# Patient Record
Sex: MALE | Race: BLACK OR AFRICAN AMERICAN | NOT HISPANIC OR LATINO | Employment: FULL TIME | URBAN - METROPOLITAN AREA
[De-identification: names, ages, dates, MRNs, and addresses within clinical notes are randomized per-mention and may not be internally consistent; named-entity substitution may affect disease eponyms.]

---

## 2024-08-16 ENCOUNTER — HOSPITAL ENCOUNTER (EMERGENCY)
Facility: HOSPITAL | Age: 64
Discharge: HOME | End: 2024-08-16
Attending: EMERGENCY MEDICINE
Payer: COMMERCIAL

## 2024-08-16 VITALS
BODY MASS INDEX: 26.05 KG/M2 | WEIGHT: 203 LBS | SYSTOLIC BLOOD PRESSURE: 140 MMHG | TEMPERATURE: 96.6 F | HEIGHT: 74 IN | DIASTOLIC BLOOD PRESSURE: 75 MMHG | RESPIRATION RATE: 18 BRPM | HEART RATE: 82 BPM | OXYGEN SATURATION: 98 %

## 2024-08-16 DIAGNOSIS — R33.9 URINARY RETENTION: Primary | ICD-10-CM

## 2024-08-16 LAB
ALBUMIN SERPL BCP-MCNC: 4.3 G/DL (ref 3.4–5)
ALP SERPL-CCNC: 240 U/L (ref 33–136)
ALT SERPL W P-5'-P-CCNC: 22 U/L (ref 10–52)
ANION GAP SERPL CALC-SCNC: 10 MMOL/L (ref 10–20)
APPEARANCE UR: CLEAR
AST SERPL W P-5'-P-CCNC: 21 U/L (ref 9–39)
BASOPHILS # BLD AUTO: 0.03 X10*3/UL (ref 0–0.1)
BASOPHILS NFR BLD AUTO: 0.4 %
BILIRUB SERPL-MCNC: 0.7 MG/DL (ref 0–1.2)
BILIRUB UR STRIP.AUTO-MCNC: NEGATIVE MG/DL
BUN SERPL-MCNC: 16 MG/DL (ref 6–23)
CALCIUM SERPL-MCNC: 9.3 MG/DL (ref 8.6–10.3)
CHLORIDE SERPL-SCNC: 105 MMOL/L (ref 98–107)
CO2 SERPL-SCNC: 27 MMOL/L (ref 21–32)
COLOR UR: ABNORMAL
CREAT SERPL-MCNC: 1.36 MG/DL (ref 0.5–1.3)
EGFRCR SERPLBLD CKD-EPI 2021: 58 ML/MIN/1.73M*2
EOSINOPHIL # BLD AUTO: 0.02 X10*3/UL (ref 0–0.7)
EOSINOPHIL NFR BLD AUTO: 0.3 %
ERYTHROCYTE [DISTWIDTH] IN BLOOD BY AUTOMATED COUNT: 12.9 % (ref 11.5–14.5)
GLUCOSE SERPL-MCNC: 137 MG/DL (ref 74–99)
GLUCOSE UR STRIP.AUTO-MCNC: NORMAL MG/DL
HCT VFR BLD AUTO: 43.9 % (ref 41–52)
HGB BLD-MCNC: 14.5 G/DL (ref 13.5–17.5)
IMM GRANULOCYTES # BLD AUTO: 0.02 X10*3/UL (ref 0–0.7)
IMM GRANULOCYTES NFR BLD AUTO: 0.3 % (ref 0–0.9)
KETONES UR STRIP.AUTO-MCNC: NEGATIVE MG/DL
LEUKOCYTE ESTERASE UR QL STRIP.AUTO: NEGATIVE
LYMPHOCYTES # BLD AUTO: 0.91 X10*3/UL (ref 1.2–4.8)
LYMPHOCYTES NFR BLD AUTO: 13.1 %
MCH RBC QN AUTO: 30.3 PG (ref 26–34)
MCHC RBC AUTO-ENTMCNC: 33 G/DL (ref 32–36)
MCV RBC AUTO: 92 FL (ref 80–100)
MONOCYTES # BLD AUTO: 0.39 X10*3/UL (ref 0.1–1)
MONOCYTES NFR BLD AUTO: 5.6 %
NEUTROPHILS # BLD AUTO: 5.59 X10*3/UL (ref 1.2–7.7)
NEUTROPHILS NFR BLD AUTO: 80.3 %
NITRITE UR QL STRIP.AUTO: NEGATIVE
NRBC BLD-RTO: 0 /100 WBCS (ref 0–0)
PH UR STRIP.AUTO: 6 [PH]
PLATELET # BLD AUTO: 239 X10*3/UL (ref 150–450)
POTASSIUM SERPL-SCNC: 4.1 MMOL/L (ref 3.5–5.3)
PROT SERPL-MCNC: 7.2 G/DL (ref 6.4–8.2)
PROT UR STRIP.AUTO-MCNC: NEGATIVE MG/DL
RBC # BLD AUTO: 4.78 X10*6/UL (ref 4.5–5.9)
RBC # UR STRIP.AUTO: ABNORMAL /UL
RBC #/AREA URNS AUTO: ABNORMAL /HPF
SODIUM SERPL-SCNC: 138 MMOL/L (ref 136–145)
SP GR UR STRIP.AUTO: 1.02
UROBILINOGEN UR STRIP.AUTO-MCNC: NORMAL MG/DL
WBC # BLD AUTO: 7 X10*3/UL (ref 4.4–11.3)
WBC #/AREA URNS AUTO: ABNORMAL /HPF

## 2024-08-16 PROCEDURE — 51702 INSERT TEMP BLADDER CATH: CPT

## 2024-08-16 PROCEDURE — 85025 COMPLETE CBC W/AUTO DIFF WBC: CPT

## 2024-08-16 PROCEDURE — 36415 COLL VENOUS BLD VENIPUNCTURE: CPT

## 2024-08-16 PROCEDURE — P9612 CATHETERIZE FOR URINE SPEC: HCPCS

## 2024-08-16 PROCEDURE — 99283 EMERGENCY DEPT VISIT LOW MDM: CPT

## 2024-08-16 PROCEDURE — 80053 COMPREHEN METABOLIC PANEL: CPT

## 2024-08-16 PROCEDURE — 81001 URINALYSIS AUTO W/SCOPE: CPT

## 2024-08-16 ASSESSMENT — COLUMBIA-SUICIDE SEVERITY RATING SCALE - C-SSRS
1. IN THE PAST MONTH, HAVE YOU WISHED YOU WERE DEAD OR WISHED YOU COULD GO TO SLEEP AND NOT WAKE UP?: NO
2. HAVE YOU ACTUALLY HAD ANY THOUGHTS OF KILLING YOURSELF?: NO
6. HAVE YOU EVER DONE ANYTHING, STARTED TO DO ANYTHING, OR PREPARED TO DO ANYTHING TO END YOUR LIFE?: NO

## 2024-08-16 ASSESSMENT — PAIN DESCRIPTION - DESCRIPTORS: DESCRIPTORS: PRESSURE

## 2024-08-16 ASSESSMENT — PAIN DESCRIPTION - LOCATION: LOCATION: ABDOMEN

## 2024-08-16 ASSESSMENT — PAIN - FUNCTIONAL ASSESSMENT: PAIN_FUNCTIONAL_ASSESSMENT: 0-10

## 2024-08-16 ASSESSMENT — PAIN DESCRIPTION - ORIENTATION: ORIENTATION: LOWER

## 2024-08-16 ASSESSMENT — PAIN SCALES - GENERAL: PAINLEVEL_OUTOF10: 10 - WORST POSSIBLE PAIN

## 2024-08-16 NOTE — ED TRIAGE NOTES
Pt presents with the c/o inability to urinate. Pt states that his last void was 2300 last evening. Pt states he has prostate problems.

## 2024-08-16 NOTE — ED PROVIDER NOTES
History of Present Illness     History provided by: Patient  Limitations to History: None  External Records Reviewed with Brief Summary: No old records available for review    HPI:  Hernandez Lazcano is a 64 y.o. male with a past medical history of BPH presenting to the emergency department today with urinary retention and abdominal distention/discomfort that has been ongoing since 430 this morning.  States he last urinated around 11 PM last night.  Denies any fever or chills.  Notes that he has problems with his prostate and has had gradual increase in discomfort has attempted to make urology appointment however due to either his work schedule or their schedule he has been unsuccessful.  Denies any surgical history.  Denies any burning with urination prior to the onset of this.  Denies any urinary frequency prior to this.  Denies any nausea or vomiting.  Denies pain but notes he is uncomfortable.  Denies similar symptoms in the past.  No other associated signs or symptoms report at this time.    Physical Exam   Triage vitals:  T 35.9 °C (96.6 °F)  HR (!) 108  BP (!) 165/149 (unable to sit still)  RR (!) 22  O2 97 % None (Room air)    Physical Exam  Constitutional:       General: He is in acute distress (Uncomfortable and pacing around the room.).      Appearance: Normal appearance. He is not toxic-appearing.   HENT:      Head: Normocephalic and atraumatic.      Mouth/Throat:      Mouth: Mucous membranes are moist.   Eyes:      General: No scleral icterus.     Conjunctiva/sclera: Conjunctivae normal.   Cardiovascular:      Rate and Rhythm: Regular rhythm. Tachycardia present.      Pulses: Normal pulses.   Pulmonary:      Effort: Pulmonary effort is normal.      Breath sounds: Normal breath sounds.   Abdominal:      General: There is distension (Significant abdominal distention).      Palpations: Abdomen is soft.      Tenderness: There is no abdominal tenderness.   Musculoskeletal:         General: Normal range of  motion.      Cervical back: Normal range of motion and neck supple.   Skin:     General: Skin is warm and dry.      Capillary Refill: Capillary refill takes less than 2 seconds.   Neurological:      General: No focal deficit present.      Mental Status: He is alert.      Gait: Gait normal.   Psychiatric:         Mood and Affect: Mood normal.         Behavior: Behavior normal.         Thought Content: Thought content normal.         Judgment: Judgment normal.          Medical Decision Making & ED Course   Medical Decision Makin y.o. male with a past medical history of BPH coming in with concern for urinary retention.  Notes that he is never had similar symptoms in the past.  Labs in addition to a UA were collected.  No concern for UTI.  Patient does have a slight LAILA creatinine of 1.36 likely in the setting of urinary retention.  Bladder scan showed 566, a coudé catheter was placed.  Patient was provided with follow-up instructions with urology in addition to discharge instructions on Hi care and maintenance.  Some blood was noted in the urine likely in the setting of Hi catheter placement.  Patient's blood pressure in addition to heart rate and respiration improved after the placement of the Hi catheter.  Vitals likely abnormal in the setting of discomfort and distress that the patient was experiencing with the urinary retention.  Notes that he is already on Flomax for his BPH.  Return precautions and follow-up instructions were provided to the patient and patient discharged home in stable condition.  ----    Differential diagnoses considered include but are not limited to: Urinary retention, enlarged prostate, UTI     Social Determinants of Health which Significantly Impact Care: None identified     EKG Independent Interpretation: See ED Course    Independent Result Review and Interpretation: See ED course    Chronic conditions affecting the patient's care: See HPI    The patient was discussed with  the following consultants/services: None    Care Considerations: See [facility]    ED Course:  Diagnoses as of 08/16/24 1413   Urinary retention     Disposition   As a result of the work-up, the patient was discharged home.  he was informed of his diagnosis and instructed to come back with any concerns or worsening of condition.  he and was agreeable to the plan as discussed above.  he was given the opportunity to ask questions.  All of the patient's questions were answered.    Seen and discussed with ED Attending  Shilpi Gonzlaes DO, PGY-2  Emergency Medicine     Shilpi Gonzales DO  Resident  08/16/24 7102

## 2024-08-16 NOTE — DISCHARGE INSTRUCTIONS
If you have any new or worsening symptoms please return to the emergency department.  You have a Hi catheter that was placed to have attached instructions on how to take care of this.  It is important for you to follow-up with urology outpatient and discuss when this needs to be removed.

## 2024-08-21 NOTE — PROGRESS NOTES
[facility]  Outpatient Clinic Note    Camron Lazcano is a 64 y.o. male    History of Present Illness   Patient presenting to clinic today NPV for TOV s/p ED visit 8/16/2024 for urinary retention.  ml. Hi catheter placed. History of BPH taking Tamsulosin 0.4 mg. Reports that he has not been consistently taking Flomax. He has no PCP and no other significant medical history. Prior to ED visit he reports that he had missed a couple of days of Flomax and was having difficulty emptying bladder. No gross hematuria, dysuria, frequency, urgency, or UTI history     Past Medical History and Surgical History   No past medical history on file.  No past surgical history on file.    Medications  No current outpatient medications on file prior to visit.     No current facility-administered medications on file prior to visit.       Objective   Physicial Exam  General: Well developed, well nourished, alert and cooperative, appears in no acute distress  Eyes: Non-injected conjunctiva, sclera clear, no proptosis  Cardiac: Extremities are warm and well perfused. No edema, cyanosis or pallor.   Lungs: Breathing is easy, non-labored. Speaking in clear and complete sentences. Normal diaphragmatic movement.  MSK: Ambulatory with steady gait, unassisted  Neuro: alert and oriented to person, place and time  Psych: Demonstrates good judgement and reason, without hallucinations, abnormal affect or abnormal behaviors.  Skin: no obvious lesions, no rashes.    No visits with results within 1 Day(s) from this visit.   Latest known visit with results is:   Admission on 08/16/2024, Discharged on 08/16/2024   Component Date Value Ref Range Status    WBC 08/16/2024 7.0  4.4 - 11.3 x10*3/uL Final    nRBC 08/16/2024 0.0  0.0 - 0.0 /100 WBCs Final    RBC 08/16/2024 4.78  4.50 - 5.90 x10*6/uL Final    Hemoglobin 08/16/2024 14.5  13.5 - 17.5 g/dL Final    Hematocrit 08/16/2024 43.9  41.0 - 52.0 % Final    MCV 08/16/2024 92  80 -  100 fL Final    MCH 08/16/2024 30.3  26.0 - 34.0 pg Final    MCHC 08/16/2024 33.0  32.0 - 36.0 g/dL Final    RDW 08/16/2024 12.9  11.5 - 14.5 % Final    Platelets 08/16/2024 239  150 - 450 x10*3/uL Final    Neutrophils % 08/16/2024 80.3  40.0 - 80.0 % Final    Immature Granulocytes %, Automated 08/16/2024 0.3  0.0 - 0.9 % Final    Immature Granulocyte Count (IG) includes promyelocytes, myelocytes and metamyelocytes but does not include bands. Percent differential counts (%) should be interpreted in the context of the absolute cell counts (cells/UL).    Lymphocytes % 08/16/2024 13.1  13.0 - 44.0 % Final    Monocytes % 08/16/2024 5.6  2.0 - 10.0 % Final    Eosinophils % 08/16/2024 0.3  0.0 - 6.0 % Final    Basophils % 08/16/2024 0.4  0.0 - 2.0 % Final    Neutrophils Absolute 08/16/2024 5.59  1.20 - 7.70 x10*3/uL Final    Percent differential counts (%) should be interpreted in the context of the absolute cell counts (cells/uL).    Immature Granulocytes Absolute, Au* 08/16/2024 0.02  0.00 - 0.70 x10*3/uL Final    Lymphocytes Absolute 08/16/2024 0.91 (L)  1.20 - 4.80 x10*3/uL Final    Monocytes Absolute 08/16/2024 0.39  0.10 - 1.00 x10*3/uL Final    Eosinophils Absolute 08/16/2024 0.02  0.00 - 0.70 x10*3/uL Final    Basophils Absolute 08/16/2024 0.03  0.00 - 0.10 x10*3/uL Final    Glucose 08/16/2024 137 (H)  74 - 99 mg/dL Final    Sodium 08/16/2024 138  136 - 145 mmol/L Final    Potassium 08/16/2024 4.1  3.5 - 5.3 mmol/L Final    Chloride 08/16/2024 105  98 - 107 mmol/L Final    Bicarbonate 08/16/2024 27  21 - 32 mmol/L Final    Anion Gap 08/16/2024 10  10 - 20 mmol/L Final    Urea Nitrogen 08/16/2024 16  6 - 23 mg/dL Final    Creatinine 08/16/2024 1.36 (H)  0.50 - 1.30 mg/dL Final    eGFR 08/16/2024 58 (L)  >60 mL/min/1.73m*2 Final    Calculations of estimated GFR are performed using the 2021 CKD-EPI Study Refit equation without the race variable for the IDMS-Traceable creatinine  methods.  https://jasn.asnjournals.org/content/early/2021/09/22/ASN.3531334228    Calcium 08/16/2024 9.3  8.6 - 10.3 mg/dL Final    Albumin 08/16/2024 4.3  3.4 - 5.0 g/dL Final    Alkaline Phosphatase 08/16/2024 240 (H)  33 - 136 U/L Final    Total Protein 08/16/2024 7.2  6.4 - 8.2 g/dL Final    AST 08/16/2024 21  9 - 39 U/L Final    Bilirubin, Total 08/16/2024 0.7  0.0 - 1.2 mg/dL Final    ALT 08/16/2024 22  10 - 52 U/L Final    Patients treated with Sulfasalazine may generate falsely decreased results for ALT.    Color, Urine 08/16/2024 Light-Yellow  Light-Yellow, Yellow, Dark-Yellow Final    Appearance, Urine 08/16/2024 Clear  Clear Final    Specific Gravity, Urine 08/16/2024 1.016  1.005 - 1.035 Final    pH, Urine 08/16/2024 6.0  5.0, 5.5, 6.0, 6.5, 7.0, 7.5, 8.0 Final    Protein, Urine 08/16/2024 NEGATIVE  NEGATIVE, 10 (TRACE), 20 (TRACE) mg/dL Final    Glucose, Urine 08/16/2024 Normal  Normal mg/dL Final    Blood, Urine 08/16/2024 0.06 (1+) (A)  NEGATIVE Final    Ketones, Urine 08/16/2024 NEGATIVE  NEGATIVE mg/dL Final    Bilirubin, Urine 08/16/2024 NEGATIVE  NEGATIVE Final    Urobilinogen, Urine 08/16/2024 Normal  Normal mg/dL Final    Nitrite, Urine 08/16/2024 NEGATIVE  NEGATIVE Final    Leukocyte Esterase, Urine 08/16/2024 NEGATIVE  NEGATIVE Final    WBC, Urine 08/16/2024 1-5  1-5, NONE /HPF Final    RBC, Urine 08/16/2024 11-20 (A)  NONE, 1-2, 3-5 /HPF Final      Review of Systems  All other systems have been reviewed and are negative for complaint.      Assessment and Plan   We discussed urinary retention in great detail. We discussed management of urinary retention to include indwelling catheter or CIC. We discussed risks of unmanaged urinary retention including irreversible kidney injury and increased risk of UTI. We discussed TOV today.    [] TOV today passed 180 ml sterile water instilled, 160 ml output clear yellow urine   [] Drink plenty of fluids to maintain hydration and clear urine  output    Continue Tamsulosin 0.4 mg daily. Refilled today   RTC in 6 months, sooner if needed.     All questions and concerns were addressed. Patient verbalizes understanding and has no other questions at this time. If you have any questions about your care, do not hesitate to call and leave a message, we return calls in a timely manner.    Sanjuana Pop-- LEISA LAI  Office Phone:  [phone]

## 2024-08-22 ENCOUNTER — OFFICE VISIT (OUTPATIENT)
Dept: UROLOGY | Facility: CLINIC | Age: 64
End: 2024-08-22
Payer: COMMERCIAL

## 2024-08-22 DIAGNOSIS — N13.8 BPH WITH OBSTRUCTION/LOWER URINARY TRACT SYMPTOMS: Primary | ICD-10-CM

## 2024-08-22 DIAGNOSIS — N40.1 BPH WITH OBSTRUCTION/LOWER URINARY TRACT SYMPTOMS: Primary | ICD-10-CM

## 2024-08-22 DIAGNOSIS — R33.9 URINARY RETENTION: ICD-10-CM

## 2024-08-22 PROCEDURE — 99203 OFFICE O/P NEW LOW 30 MIN: CPT | Performed by: NURSE PRACTITIONER

## 2024-08-22 PROCEDURE — G2211 COMPLEX E/M VISIT ADD ON: HCPCS | Performed by: NURSE PRACTITIONER

## 2024-08-22 PROCEDURE — 51700 IRRIGATION OF BLADDER: CPT | Performed by: NURSE PRACTITIONER

## 2024-08-22 RX ORDER — TAMSULOSIN HYDROCHLORIDE 0.4 MG/1
0.4 CAPSULE ORAL
Qty: 90 CAPSULE | Refills: 3 | Status: SHIPPED | OUTPATIENT
Start: 2024-08-22 | End: 2024-08-22 | Stop reason: SDUPTHER

## 2024-08-22 RX ORDER — TAMSULOSIN HYDROCHLORIDE 0.4 MG/1
0.4 CAPSULE ORAL DAILY
COMMUNITY

## 2024-08-26 DIAGNOSIS — R33.9 URINARY RETENTION: ICD-10-CM

## 2024-08-26 DIAGNOSIS — N13.8 BPH WITH OBSTRUCTION/LOWER URINARY TRACT SYMPTOMS: Primary | ICD-10-CM

## 2024-08-26 DIAGNOSIS — N40.1 BPH WITH OBSTRUCTION/LOWER URINARY TRACT SYMPTOMS: Primary | ICD-10-CM

## 2024-08-26 RX ORDER — TAMSULOSIN HYDROCHLORIDE 0.4 MG/1
0.4 CAPSULE ORAL DAILY
Qty: 90 CAPSULE | Refills: 3 | Status: SHIPPED | OUTPATIENT
Start: 2024-08-26 | End: 2024-08-27 | Stop reason: SDUPTHER

## 2024-08-27 ENCOUNTER — OFFICE VISIT (OUTPATIENT)
Dept: UROLOGY | Facility: HOSPITAL | Age: 64
End: 2024-08-27
Payer: COMMERCIAL

## 2024-08-27 VITALS
HEART RATE: 117 BPM | WEIGHT: 199.74 LBS | TEMPERATURE: 97 F | RESPIRATION RATE: 20 BRPM | BODY MASS INDEX: 25.64 KG/M2 | DIASTOLIC BLOOD PRESSURE: 90 MMHG | OXYGEN SATURATION: 99 % | SYSTOLIC BLOOD PRESSURE: 169 MMHG

## 2024-08-27 DIAGNOSIS — N40.1 BPH WITH OBSTRUCTION/LOWER URINARY TRACT SYMPTOMS: ICD-10-CM

## 2024-08-27 DIAGNOSIS — N13.8 BPH WITH OBSTRUCTION/LOWER URINARY TRACT SYMPTOMS: ICD-10-CM

## 2024-08-27 DIAGNOSIS — R33.9 URINARY RETENTION: ICD-10-CM

## 2024-08-27 PROCEDURE — 51703 INSERT BLADDER CATH COMPLEX: CPT | Performed by: NURSE PRACTITIONER

## 2024-08-27 PROCEDURE — 99213 OFFICE O/P EST LOW 20 MIN: CPT | Performed by: NURSE PRACTITIONER

## 2024-08-27 PROCEDURE — 1036F TOBACCO NON-USER: CPT | Performed by: NURSE PRACTITIONER

## 2024-08-27 RX ORDER — TAMSULOSIN HYDROCHLORIDE 0.4 MG/1
0.8 CAPSULE ORAL DAILY
Qty: 180 CAPSULE | Refills: 3 | Status: SHIPPED | OUTPATIENT
Start: 2024-08-27

## 2024-08-27 ASSESSMENT — PAIN SCALES - GENERAL: PAINLEVEL: 0-NO PAIN

## 2024-08-27 NOTE — PROGRESS NOTES
[facility]  Outpatient Clinic Note    Subjective   Hernandez Lazcano is a 64 y.o. male    History of Present Illness   Patient presenting to clinic today for PVR check. Last visit with myself 8/22/2024 for TOV s/p ED visit 8/16/2024 for urinary retention.  ml. History of BPH taking Tamsulosin 0.4 mg. Reports that he has not been consistently taking Flomax. He has no PCP and no other significant medical history. Prior to ED visit he reports that he had missed a couple of days of Flomax and was having difficulty emptying bladder. Today's visit patient states that he has not been able to empty bladder since 11:00 pm last night.   PVR today 980 ml   He has been taking Tamsulosin 0.4 mg daily. He denies fevers, chills, n/v, or gross hematuria.       Past Medical History and Surgical History   No past medical history on file.  No past surgical history on file.    Medications  Current Outpatient Medications on File Prior to Visit   Medication Sig Dispense Refill    tamsulosin (Flomax) 0.4 mg 24 hr capsule Take 1 capsule (0.4 mg) by mouth once daily. 90 capsule 3    [DISCONTINUED] tamsulosin (Flomax) 0.4 mg 24 hr capsule Take 1 capsule (0.4 mg) by mouth once daily.      [DISCONTINUED] tamsulosin (Flomax) 0.4 mg 24 hr capsule Take 1 capsule (0.4 mg) by mouth once daily in the morning. Take before meals. 90 capsule 3     No current facility-administered medications on file prior to visit.       Objective   Physicial Exam  General: Well developed, well nourished, alert and cooperative, appears in no acute distress  Eyes: Non-injected conjunctiva, sclera clear, no proptosis  Cardiac: Extremities are warm and well perfused. No edema, cyanosis or pallor.   Lungs: Breathing is easy, non-labored. Speaking in clear and complete sentences. Normal diaphragmatic movement.  MSK: Ambulatory with steady gait, unassisted  Neuro: alert and oriented to person, place and time  Psych: Demonstrates good judgement and reason, without  hallucinations, abnormal affect or abnormal behaviors.  Skin: no obvious lesions, no rashes.    No visits with results within 1 Day(s) from this visit.   Latest known visit with results is:   Admission on 08/16/2024, Discharged on 08/16/2024   Component Date Value Ref Range Status    WBC 08/16/2024 7.0  4.4 - 11.3 x10*3/uL Final    nRBC 08/16/2024 0.0  0.0 - 0.0 /100 WBCs Final    RBC 08/16/2024 4.78  4.50 - 5.90 x10*6/uL Final    Hemoglobin 08/16/2024 14.5  13.5 - 17.5 g/dL Final    Hematocrit 08/16/2024 43.9  41.0 - 52.0 % Final    MCV 08/16/2024 92  80 - 100 fL Final    MCH 08/16/2024 30.3  26.0 - 34.0 pg Final    MCHC 08/16/2024 33.0  32.0 - 36.0 g/dL Final    RDW 08/16/2024 12.9  11.5 - 14.5 % Final    Platelets 08/16/2024 239  150 - 450 x10*3/uL Final    Neutrophils % 08/16/2024 80.3  40.0 - 80.0 % Final    Immature Granulocytes %, Automated 08/16/2024 0.3  0.0 - 0.9 % Final    Immature Granulocyte Count (IG) includes promyelocytes, myelocytes and metamyelocytes but does not include bands. Percent differential counts (%) should be interpreted in the context of the absolute cell counts (cells/UL).    Lymphocytes % 08/16/2024 13.1  13.0 - 44.0 % Final    Monocytes % 08/16/2024 5.6  2.0 - 10.0 % Final    Eosinophils % 08/16/2024 0.3  0.0 - 6.0 % Final    Basophils % 08/16/2024 0.4  0.0 - 2.0 % Final    Neutrophils Absolute 08/16/2024 5.59  1.20 - 7.70 x10*3/uL Final    Percent differential counts (%) should be interpreted in the context of the absolute cell counts (cells/uL).    Immature Granulocytes Absolute, Au* 08/16/2024 0.02  0.00 - 0.70 x10*3/uL Final    Lymphocytes Absolute 08/16/2024 0.91 (L)  1.20 - 4.80 x10*3/uL Final    Monocytes Absolute 08/16/2024 0.39  0.10 - 1.00 x10*3/uL Final    Eosinophils Absolute 08/16/2024 0.02  0.00 - 0.70 x10*3/uL Final    Basophils Absolute 08/16/2024 0.03  0.00 - 0.10 x10*3/uL Final    Glucose 08/16/2024 137 (H)  74 - 99 mg/dL Final    Sodium 08/16/2024 138  136 - 145  mmol/L Final    Potassium 08/16/2024 4.1  3.5 - 5.3 mmol/L Final    Chloride 08/16/2024 105  98 - 107 mmol/L Final    Bicarbonate 08/16/2024 27  21 - 32 mmol/L Final    Anion Gap 08/16/2024 10  10 - 20 mmol/L Final    Urea Nitrogen 08/16/2024 16  6 - 23 mg/dL Final    Creatinine 08/16/2024 1.36 (H)  0.50 - 1.30 mg/dL Final    eGFR 08/16/2024 58 (L)  >60 mL/min/1.73m*2 Final    Calculations of estimated GFR are performed using the 2021 CKD-EPI Study Refit equation without the race variable for the IDMS-Traceable creatinine methods.  https://jasn.asnjournals.org/content/early/2021/09/22/ASN.3040448656    Calcium 08/16/2024 9.3  8.6 - 10.3 mg/dL Final    Albumin 08/16/2024 4.3  3.4 - 5.0 g/dL Final    Alkaline Phosphatase 08/16/2024 240 (H)  33 - 136 U/L Final    Total Protein 08/16/2024 7.2  6.4 - 8.2 g/dL Final    AST 08/16/2024 21  9 - 39 U/L Final    Bilirubin, Total 08/16/2024 0.7  0.0 - 1.2 mg/dL Final    ALT 08/16/2024 22  10 - 52 U/L Final    Patients treated with Sulfasalazine may generate falsely decreased results for ALT.    Color, Urine 08/16/2024 Light-Yellow  Light-Yellow, Yellow, Dark-Yellow Final    Appearance, Urine 08/16/2024 Clear  Clear Final    Specific Gravity, Urine 08/16/2024 1.016  1.005 - 1.035 Final    pH, Urine 08/16/2024 6.0  5.0, 5.5, 6.0, 6.5, 7.0, 7.5, 8.0 Final    Protein, Urine 08/16/2024 NEGATIVE  NEGATIVE, 10 (TRACE), 20 (TRACE) mg/dL Final    Glucose, Urine 08/16/2024 Normal  Normal mg/dL Final    Blood, Urine 08/16/2024 0.06 (1+) (A)  NEGATIVE Final    Ketones, Urine 08/16/2024 NEGATIVE  NEGATIVE mg/dL Final    Bilirubin, Urine 08/16/2024 NEGATIVE  NEGATIVE Final    Urobilinogen, Urine 08/16/2024 Normal  Normal mg/dL Final    Nitrite, Urine 08/16/2024 NEGATIVE  NEGATIVE Final    Leukocyte Esterase, Urine 08/16/2024 NEGATIVE  NEGATIVE Final    WBC, Urine 08/16/2024 1-5  1-5, NONE /HPF Final    RBC, Urine 08/16/2024 11-20 (A)  NONE, 1-2, 3-5 /HPF Final      Review of Systems  All  other systems have been reviewed and are negative for complaint.      Assessment and Plan   We discussed urinary retention in great detail. We discussed management of urinary retention to include indwelling catheter or CIC. We discussed risks of unmanaged urinary retention including irreversible kidney injury and increased risk of UTI.     Today we discussed BPH. BPH is the benign growth of prostate tissue that may cause bothersome urinary symptoms. The mechanism of action as well as the risks, benefits, common side effects, and alternatives to all prescribed medications were discussed with the patient at length. Additionally, if you continue to experience bothersome symptoms despite medication, there are minimally invasive procedures to alleviate these symptoms.    Patient was prepped in sterile fashion and 16 F coude catheter was inserted with sterile technique without complication. There was return of clear yellow urine. Patient tolerated well.    Increase Tamsulosin 0.8 mg daily. Refilled today   RTC in 2 weeks for TOV.   Patient will be scheduled with Dr. Oglesby to discuss BPH procedures.     All questions and concerns were addressed. Patient verbalizes understanding and has no other questions at this time. If you have any questions about your care, do not hesitate to call and leave a message, we return calls in a timely manner.    Sanjuana Pop-- LEISA LAI  Office Phone:  [phone]

## 2024-08-29 ENCOUNTER — OFFICE VISIT (OUTPATIENT)
Dept: UROLOGY | Facility: CLINIC | Age: 64
End: 2024-08-29
Payer: COMMERCIAL

## 2024-08-29 DIAGNOSIS — R33.9 URINARY RETENTION: Primary | ICD-10-CM

## 2024-08-29 PROCEDURE — 99213 OFFICE O/P EST LOW 20 MIN: CPT | Performed by: UROLOGY

## 2024-08-29 NOTE — PROGRESS NOTES
"  Patient is a 64 y.o. male presenting with incontinence    SUBJECTIVE:  HPI   He is added on.  He reports leakage from around his catheter.  He has catheter placed recently for retention.  He is on tamsulosin.        No results found for: \"URINECULTURE\"     No past medical history on file.   No past surgical history on file.   No family history on file.   Social History     Socioeconomic History    Marital status: Single   Tobacco Use    Smoking status: Never    Smokeless tobacco: Never        Review of Systems   Constitutional: denies any unintentional weight loss or change in strength.  Integumentary: denies any rashes or pruritus.  Eyes: denies any double vision or eye pain.  Ear/Nose/Mouth/Throat: denies any nosebleeds or gum bleeds.  Cardiovascular: denies any chest pain or syncope.  Respiratory: denies hemoptysis.  Gastrointestinal: denies nausea or vomiting.  Musculoskeletal: denies muscle cramping or weakness.  Neurologic: denies convulsions or seizures.  Hematologic/Lymphatic: denies bleeding tendencies.  Endocrine: denies heat/cold intolerance.  All other systems have been reviewed and are negative unless otherwise noted in the HPI.    OBJECTIVE:  There were no vitals taken for this visit.  Physical Exam   Alert, no distress  Normal resp effort  Bladder not palpable.   Catheter draining clear.      Labs:  Lab Results   Component Value Date    WBC 7.0 08/16/2024    HGB 14.5 08/16/2024    HCT 43.9 08/16/2024     08/16/2024    ALT 22 08/16/2024    AST 21 08/16/2024     08/16/2024    K 4.1 08/16/2024     08/16/2024    CREATININE 1.36 (H) 08/16/2024    BUN 16 08/16/2024    CO2 27 08/16/2024     No results found for: \"KPSAT\", \"KPSAP\"  IMAGING:        PROCEDURES:    ASSESSMENT/PLAN:  64 year old has urinary retention, catheter in place.  His catheter is draining well.  His incontinence appears secondary to bladder spasms.  He will keep his scheduled appointment.         All questions were " answered to the patient’s satisfaction.  Patient agrees with the plan and wishes to proceed.  Follow-up will be scheduled appropriately.     Omar Garcia MD

## 2024-09-10 ENCOUNTER — OFFICE VISIT (OUTPATIENT)
Dept: UROLOGY | Facility: HOSPITAL | Age: 64
End: 2024-09-10
Payer: COMMERCIAL

## 2024-09-10 VITALS
SYSTOLIC BLOOD PRESSURE: 126 MMHG | DIASTOLIC BLOOD PRESSURE: 75 MMHG | HEART RATE: 94 BPM | TEMPERATURE: 98.8 F | RESPIRATION RATE: 16 BRPM | OXYGEN SATURATION: 98 %

## 2024-09-10 DIAGNOSIS — N40.1 BPH WITH OBSTRUCTION/LOWER URINARY TRACT SYMPTOMS: Primary | ICD-10-CM

## 2024-09-10 DIAGNOSIS — N13.8 BPH WITH OBSTRUCTION/LOWER URINARY TRACT SYMPTOMS: Primary | ICD-10-CM

## 2024-09-10 PROCEDURE — 51700 IRRIGATION OF BLADDER: CPT | Performed by: NURSE PRACTITIONER

## 2024-09-10 PROCEDURE — 1036F TOBACCO NON-USER: CPT | Performed by: NURSE PRACTITIONER

## 2024-09-10 PROCEDURE — 99213 OFFICE O/P EST LOW 20 MIN: CPT | Performed by: NURSE PRACTITIONER

## 2024-09-10 RX ORDER — SODIUM CHLORIDE 0.9 G/100ML
IRRIGANT IRRIGATION
Status: DISCONTINUED
Start: 2024-09-10 | End: 2024-09-10 | Stop reason: HOSPADM

## 2024-09-10 ASSESSMENT — PAIN SCALES - GENERAL: PAINLEVEL: 0-NO PAIN

## 2024-09-10 NOTE — PROGRESS NOTES
[facility]  Outpatient Clinic Note    Subjective   Hernandez Lazcano is a 64 y.o. male    History of Present Illness   Patient presenting to clinic today for TOV. Last visit with Dr. Garcia 8/29/2024 to evaluate leaking   Around catheter. ED visit 8/16/2024 for urinary retention. Passed TOV with myself 8/22/2024. He had been taking Tamsulosin 0.4 mg, however not consistently. He has no PCP or other significant medical history.   8/27/2024 office visit for PVR check which was 980 ml. Hi catheter placed and increased Tamsulosin to 0.8 mg daily. He denies fevers, chills, n/v, or gross hematuria.     Past Medical History and Surgical History   No past medical history on file.  No past surgical history on file.    Medications  Current Outpatient Medications on File Prior to Visit   Medication Sig Dispense Refill    tamsulosin (Flomax) 0.4 mg 24 hr capsule Take 2 capsules (0.8 mg) by mouth once daily. 180 capsule 3     No current facility-administered medications on file prior to visit.       Objective   Physicial Exam  General: Well developed, well nourished, alert and cooperative, appears in no acute distress  Eyes: Non-injected conjunctiva, sclera clear, no proptosis  Cardiac: Extremities are warm and well perfused. No edema, cyanosis or pallor.   Lungs: Breathing is easy, non-labored. Speaking in clear and complete sentences. Normal diaphragmatic movement.  MSK: Ambulatory with steady gait, unassisted  Neuro: alert and oriented to person, place and time  Psych: Demonstrates good judgement and reason, without hallucinations, abnormal affect or abnormal behaviors.  Skin: no obvious lesions, no rashes.  : Urine clear, yellow, no gross hematuria or clots     No visits with results within 1 Day(s) from this visit.   Latest known visit with results is:   Admission on 08/16/2024, Discharged on 08/16/2024   Component Date Value Ref Range Status    WBC 08/16/2024 7.0  4.4 - 11.3 x10*3/uL Final    nRBC 08/16/2024 0.0   0.0 - 0.0 /100 WBCs Final    RBC 08/16/2024 4.78  4.50 - 5.90 x10*6/uL Final    Hemoglobin 08/16/2024 14.5  13.5 - 17.5 g/dL Final    Hematocrit 08/16/2024 43.9  41.0 - 52.0 % Final    MCV 08/16/2024 92  80 - 100 fL Final    MCH 08/16/2024 30.3  26.0 - 34.0 pg Final    MCHC 08/16/2024 33.0  32.0 - 36.0 g/dL Final    RDW 08/16/2024 12.9  11.5 - 14.5 % Final    Platelets 08/16/2024 239  150 - 450 x10*3/uL Final    Neutrophils % 08/16/2024 80.3  40.0 - 80.0 % Final    Immature Granulocytes %, Automated 08/16/2024 0.3  0.0 - 0.9 % Final    Immature Granulocyte Count (IG) includes promyelocytes, myelocytes and metamyelocytes but does not include bands. Percent differential counts (%) should be interpreted in the context of the absolute cell counts (cells/UL).    Lymphocytes % 08/16/2024 13.1  13.0 - 44.0 % Final    Monocytes % 08/16/2024 5.6  2.0 - 10.0 % Final    Eosinophils % 08/16/2024 0.3  0.0 - 6.0 % Final    Basophils % 08/16/2024 0.4  0.0 - 2.0 % Final    Neutrophils Absolute 08/16/2024 5.59  1.20 - 7.70 x10*3/uL Final    Percent differential counts (%) should be interpreted in the context of the absolute cell counts (cells/uL).    Immature Granulocytes Absolute, Au* 08/16/2024 0.02  0.00 - 0.70 x10*3/uL Final    Lymphocytes Absolute 08/16/2024 0.91 (L)  1.20 - 4.80 x10*3/uL Final    Monocytes Absolute 08/16/2024 0.39  0.10 - 1.00 x10*3/uL Final    Eosinophils Absolute 08/16/2024 0.02  0.00 - 0.70 x10*3/uL Final    Basophils Absolute 08/16/2024 0.03  0.00 - 0.10 x10*3/uL Final    Glucose 08/16/2024 137 (H)  74 - 99 mg/dL Final    Sodium 08/16/2024 138  136 - 145 mmol/L Final    Potassium 08/16/2024 4.1  3.5 - 5.3 mmol/L Final    Chloride 08/16/2024 105  98 - 107 mmol/L Final    Bicarbonate 08/16/2024 27  21 - 32 mmol/L Final    Anion Gap 08/16/2024 10  10 - 20 mmol/L Final    Urea Nitrogen 08/16/2024 16  6 - 23 mg/dL Final    Creatinine 08/16/2024 1.36 (H)  0.50 - 1.30 mg/dL Final    eGFR 08/16/2024 58 (L)  >60  mL/min/1.73m*2 Final    Calculations of estimated GFR are performed using the 2021 CKD-EPI Study Refit equation without the race variable for the IDMS-Traceable creatinine methods.  https://jasn.asnjournals.org/content/early/2021/09/22/ASN.5796726226    Calcium 08/16/2024 9.3  8.6 - 10.3 mg/dL Final    Albumin 08/16/2024 4.3  3.4 - 5.0 g/dL Final    Alkaline Phosphatase 08/16/2024 240 (H)  33 - 136 U/L Final    Total Protein 08/16/2024 7.2  6.4 - 8.2 g/dL Final    AST 08/16/2024 21  9 - 39 U/L Final    Bilirubin, Total 08/16/2024 0.7  0.0 - 1.2 mg/dL Final    ALT 08/16/2024 22  10 - 52 U/L Final    Patients treated with Sulfasalazine may generate falsely decreased results for ALT.    Color, Urine 08/16/2024 Light-Yellow  Light-Yellow, Yellow, Dark-Yellow Final    Appearance, Urine 08/16/2024 Clear  Clear Final    Specific Gravity, Urine 08/16/2024 1.016  1.005 - 1.035 Final    pH, Urine 08/16/2024 6.0  5.0, 5.5, 6.0, 6.5, 7.0, 7.5, 8.0 Final    Protein, Urine 08/16/2024 NEGATIVE  NEGATIVE, 10 (TRACE), 20 (TRACE) mg/dL Final    Glucose, Urine 08/16/2024 Normal  Normal mg/dL Final    Blood, Urine 08/16/2024 0.06 (1+) (A)  NEGATIVE Final    Ketones, Urine 08/16/2024 NEGATIVE  NEGATIVE mg/dL Final    Bilirubin, Urine 08/16/2024 NEGATIVE  NEGATIVE Final    Urobilinogen, Urine 08/16/2024 Normal  Normal mg/dL Final    Nitrite, Urine 08/16/2024 NEGATIVE  NEGATIVE Final    Leukocyte Esterase, Urine 08/16/2024 NEGATIVE  NEGATIVE Final    WBC, Urine 08/16/2024 1-5  1-5, NONE /HPF Final    RBC, Urine 08/16/2024 11-20 (A)  NONE, 1-2, 3-5 /HPF Final      Review of Systems  All other systems have been reviewed and are negative for complaint.      Assessment and Plan     [] TOV today passed. 120 ml sterile water instilled, 120 ml clear, yellow urine output   [] Drink plenty of fluids to maintain hydration and clear urine output  [] You may have some irritative voiding symptoms over the next few days: burning, frequency,  urgency    Continue Tamsulsosin 0.8 mg daily  RTC in 2 weeks for PVR check, sooner if needed.     All questions and concerns were addressed. Patient verbalizes understanding and has no other questions at this time. You are able to have email access to your chart. You can sign into [employer] or add the [employer] rocco on your smart phone to review today's visit, laboratory work and imaging.   If you have any questions about your care, do not hesitate to call and leave a message, we return calls in a timely manner.    Sanjuana Pop-- LEISA LAI  Office Phone:  [phone]

## 2024-09-12 ENCOUNTER — APPOINTMENT (OUTPATIENT)
Dept: UROLOGY | Facility: HOSPITAL | Age: 64
End: 2024-09-12
Payer: COMMERCIAL

## 2024-10-04 ENCOUNTER — APPOINTMENT (OUTPATIENT)
Dept: UROLOGY | Facility: CLINIC | Age: 64
End: 2024-10-04
Payer: COMMERCIAL

## 2024-10-04 DIAGNOSIS — R33.9 URINARY RETENTION: Primary | ICD-10-CM

## 2024-10-04 DIAGNOSIS — R39.9 UTI SYMPTOMS: ICD-10-CM

## 2024-10-04 LAB
POC APPEARANCE, URINE: CLEAR
POC BILIRUBIN, URINE: NEGATIVE
POC BLOOD, URINE: NEGATIVE
POC COLOR, URINE: YELLOW
POC GLUCOSE, URINE: NEGATIVE MG/DL
POC KETONES, URINE: NEGATIVE MG/DL
POC LEUKOCYTES, URINE: ABNORMAL
POC NITRITE,URINE: POSITIVE
POC PH, URINE: 6 PH
POC PROTEIN, URINE: NEGATIVE MG/DL
POC SPECIFIC GRAVITY, URINE: 1.02
POC UROBILINOGEN, URINE: 1 EU/DL

## 2024-10-04 PROCEDURE — 87086 URINE CULTURE/COLONY COUNT: CPT

## 2024-10-04 PROCEDURE — 81001 URINALYSIS AUTO W/SCOPE: CPT

## 2024-10-04 PROCEDURE — 87186 SC STD MICRODIL/AGAR DIL: CPT

## 2024-10-04 NOTE — PROGRESS NOTES
[facility]  Outpatient Clinic Note    Camron Lazcano is a 64 y.o. male    History of Present Illness   Patient presenting to clinic today for FUV and PVR check. History of BPH with urinary retention.   Taking Tamsulosin 0.8 mg daily. No bothersome urinary symptoms today. No straining, strong stream.   Patient denies gross hematuria, dysuria, frequency, fevers, n/v, or flank pain.     In review  ED visit 8/16/2024 for urinary retention, Hi catheter placed. Passed TOV with myself 8/22/2024.     8/27/2024 office visit for PVR check with myself which was 980 ml. Hi catheter placed and increased Tamsulosin to 0.8 mg daily.     Visit with Dr. Garcia 8/29/2024 to evaluate leaking around catheter    9/10/2024 FUV TOV with myself passed.     PVR today 0 ml     Medications  Current Outpatient Medications on File Prior to Visit   Medication Sig Dispense Refill    tamsulosin (Flomax) 0.4 mg 24 hr capsule Take 2 capsules (0.8 mg) by mouth once daily. 180 capsule 3     No current facility-administered medications on file prior to visit.       Objective   Physicial Exam  General: Well developed, well nourished, alert and cooperative, appears in no acute distress  Eyes: Non-injected conjunctiva, sclera clear, no proptosis  Cardiac: Extremities are warm and well perfused. No edema, cyanosis or pallor.   Lungs: Breathing is easy, non-labored. Speaking in clear and complete sentences. Normal diaphragmatic movement.  MSK: Ambulatory with steady gait, unassisted  Neuro: alert and oriented to person, place and time  Psych: Demonstrates good judgement and reason, without hallucinations, abnormal affect or abnormal behaviors.  Skin: no obvious lesions, no rashes.  : Urine clear, yellow, no gross hematuria or clots     Appointment on 10/04/2024   Component Date Value Ref Range Status    POC Color, Urine 10/04/2024 Yellow  Straw, Yellow, Light-Yellow Final    POC Appearance, Urine 10/04/2024 Clear  Clear Final     POC Glucose, Urine 10/04/2024 NEGATIVE  NEGATIVE mg/dl Final    POC Bilirubin, Urine 10/04/2024 NEGATIVE  NEGATIVE Final    POC Ketones, Urine 10/04/2024 NEGATIVE  NEGATIVE mg/dl Final    POC Specific Gravity, Urine 10/04/2024 1.025  1.005 - 1.035 Final    POC Blood, Urine 10/04/2024 NEGATIVE  NEGATIVE Final    POC PH, Urine 10/04/2024 6.0  No Reference Range Established PH Final    POC Protein, Urine 10/04/2024 NEGATIVE  NEGATIVE, 30 (1+) mg/dl Final    POC Urobilinogen, Urine 10/04/2024 1.0  0.2, 1.0 EU/DL Final    Poc Nitrite, Urine 10/04/2024 POSITIVE (A)  NEGATIVE Final    POC Leukocytes, Urine 10/04/2024 TRACE (A)  NEGATIVE Final      Review of Systems  All other systems have been reviewed and are negative for complaint.      Assessment and Plan     Sending Urine for Culture, will call with results and treat as clinically indicated.     Please obtain PSA in 4 to 6 weeks.     Continue Tamsulsosin 0.8 mg daily    RTC in 6 months, sooner if needed     All questions and concerns were addressed. Patient verbalizes understanding and has no other questions at this time.     Sanjuana Pop-- LEISA LAI  Office Phone:  [phone]

## 2024-10-05 LAB
APPEARANCE UR: ABNORMAL
BILIRUB UR STRIP.AUTO-MCNC: NEGATIVE MG/DL
COLOR UR: YELLOW
GLUCOSE UR STRIP.AUTO-MCNC: NORMAL MG/DL
KETONES UR STRIP.AUTO-MCNC: NEGATIVE MG/DL
LEUKOCYTE ESTERASE UR QL STRIP.AUTO: ABNORMAL
MUCOUS THREADS #/AREA URNS AUTO: ABNORMAL /LPF
NITRITE UR QL STRIP.AUTO: NEGATIVE
PH UR STRIP.AUTO: 6 [PH]
PROT UR STRIP.AUTO-MCNC: NEGATIVE MG/DL
RBC # UR STRIP.AUTO: NEGATIVE /UL
RBC #/AREA URNS AUTO: ABNORMAL /HPF
SP GR UR STRIP.AUTO: 1.02
UROBILINOGEN UR STRIP.AUTO-MCNC: NORMAL MG/DL
WBC #/AREA URNS AUTO: ABNORMAL /HPF

## 2024-10-06 LAB — BACTERIA UR CULT: ABNORMAL

## 2024-10-07 DIAGNOSIS — N30.01 ACUTE CYSTITIS WITH HEMATURIA: Primary | ICD-10-CM

## 2024-10-07 LAB — BACTERIA UR CULT: ABNORMAL

## 2024-10-07 RX ORDER — SULFAMETHOXAZOLE AND TRIMETHOPRIM 800; 160 MG/1; MG/1
1 TABLET ORAL 2 TIMES DAILY
Qty: 14 TABLET | Refills: 0 | Status: SHIPPED | OUTPATIENT
Start: 2024-10-07 | End: 2024-10-14

## 2025-02-24 ENCOUNTER — APPOINTMENT (OUTPATIENT)
Dept: UROLOGY | Facility: CLINIC | Age: 65
End: 2025-02-24
Payer: COMMERCIAL

## 2025-02-24 NOTE — PROGRESS NOTES
"  [facility]  Outpatient Clinic Note    Subjective   Hernandez Lazcano is a 65 y.o. male    History of Present Illness   Patient presenting to clinic today for FUV. History of BPH with urinary retention.   Taking Tamsulosin 0.8 mg daily. No bothersome urinary symptoms today. No straining, strong stream. Patient denies gross hematuria, dysuria, frequency, fevers, n/v, or flank pain. NTF 0  He is slightly bothered by retrograde ejaculation. Would like to consider Alfuzosin.     In review  ED visit 8/16/2024 for urinary retention, Hi catheter placed. Passed TOV with myself 8/22/2024.     8/27/2024 office visit for PVR check with myself which was 980 ml. Hi catheter placed and increased Tamsulosin to 0.8 mg daily.     Visit with Dr. Garcia 8/29/2024 to evaluate leaking around catheter    9/10/2024 FUV TOV with myself passed.     PVR today 12 ml     No results found for: \"PSA\"]    Medications  Current Outpatient Medications on File Prior to Visit   Medication Sig Dispense Refill    tamsulosin (Flomax) 0.4 mg 24 hr capsule Take 2 capsules (0.8 mg) by mouth once daily. 180 capsule 3     No current facility-administered medications on file prior to visit.       Objective   Physicial Exam  General: Well developed, well nourished, alert and cooperative, appears in no acute distress  Eyes: Non-injected conjunctiva, sclera clear, no proptosis  Cardiac: Extremities are warm and well perfused. No edema, cyanosis or pallor.   Lungs: Breathing is easy, non-labored. Speaking in clear and complete sentences. Normal diaphragmatic movement.  MSK: Ambulatory with steady gait, unassisted  Neuro: alert and oriented to person, place and time  Psych: Demonstrates good judgement and reason, without hallucinations, abnormal affect or abnormal behaviors.  Skin: no obvious lesions, no rashes.  : Urine clear, yellow, no gross hematuria or clots     No visits with results within 1 Day(s) from this visit.   Latest known visit with " results is:   Office Visit on 10/04/2024   Component Date Value Ref Range Status    POC Color, Urine 10/04/2024 Yellow  Straw, Yellow, Light-Yellow Final    POC Appearance, Urine 10/04/2024 Clear  Clear Final    POC Glucose, Urine 10/04/2024 NEGATIVE  NEGATIVE mg/dl Final    POC Bilirubin, Urine 10/04/2024 NEGATIVE  NEGATIVE Final    POC Ketones, Urine 10/04/2024 NEGATIVE  NEGATIVE mg/dl Final    POC Specific Gravity, Urine 10/04/2024 1.025  1.005 - 1.035 Final    POC Blood, Urine 10/04/2024 NEGATIVE  NEGATIVE Final    POC PH, Urine 10/04/2024 6.0  No Reference Range Established PH Final    POC Protein, Urine 10/04/2024 NEGATIVE  NEGATIVE, 30 (1+) mg/dl Final    POC Urobilinogen, Urine 10/04/2024 1.0  0.2, 1.0 EU/DL Final    Poc Nitrite, Urine 10/04/2024 POSITIVE (A)  NEGATIVE Final    POC Leukocytes, Urine 10/04/2024 TRACE (A)  NEGATIVE Final    Color, Urine 10/04/2024 Yellow  Light-Yellow, Yellow, Dark-Yellow Final    Appearance, Urine 10/04/2024 Turbid (N)  Clear Final    Specific Gravity, Urine 10/04/2024 1.023  1.005 - 1.035 Final    pH, Urine 10/04/2024 6.0  5.0, 5.5, 6.0, 6.5, 7.0, 7.5, 8.0 Final    Protein, Urine 10/04/2024 NEGATIVE  NEGATIVE, 10 (TRACE), 20 (TRACE) mg/dL Final    Glucose, Urine 10/04/2024 Normal  Normal mg/dL Final    Blood, Urine 10/04/2024 NEGATIVE  NEGATIVE Final    Ketones, Urine 10/04/2024 NEGATIVE  NEGATIVE mg/dL Final    Bilirubin, Urine 10/04/2024 NEGATIVE  NEGATIVE Final    Urobilinogen, Urine 10/04/2024 Normal  Normal mg/dL Final    Nitrite, Urine 10/04/2024 NEGATIVE  NEGATIVE Final    Leukocyte Esterase, Urine 10/04/2024 75 Chilo/µL (A)  NEGATIVE Final    Urine Culture 10/04/2024 >100,000 Escherichia coli (A)   Final    WBC, Urine 10/04/2024 11-20 (A)  1-5, NONE /HPF Final    RBC, Urine 10/04/2024 NONE  NONE, 1-2, 3-5 /HPF Final    Mucus, Urine 10/04/2024 FEW  Reference range not established. /LPF Final      Review of Systems  All other systems have been reviewed and are negative  for complaint.      Assessment and Plan       1) BPH with urinary retention    Today we discussed BPH. BPH is the benign growth of prostate tissue that may cause bothersome urinary symptoms. The mechanism of action as well as the risks, benefits, common side effects, and alternatives to all prescribed medications were discussed with the patient at length. Additionally, if you continue to experience bothersome symptoms despite medication, there are minimally invasive procedures to alleviate these symptoms.  PVR today 12 ml     Patient is bothered with retrograde ejaculation. Stop Tamsulosin 0.8 mg daily  Start Alfuzosin 10 mg daily, explained.     RTC in 6 to 8 weeks for medication response and PVR check, sooner if needed     All questions and concerns were addressed. Patient verbalizes understanding and has no other questions at this time.     Sanjuana Pop-- LEISA LAI  Office Phone:  [phone]

## 2025-02-25 ENCOUNTER — APPOINTMENT (OUTPATIENT)
Dept: UROLOGY | Facility: CLINIC | Age: 65
End: 2025-02-25
Payer: COMMERCIAL

## 2025-02-25 VITALS — TEMPERATURE: 96.5 F

## 2025-02-25 DIAGNOSIS — N13.8 BPH WITH OBSTRUCTION/LOWER URINARY TRACT SYMPTOMS: Primary | ICD-10-CM

## 2025-02-25 DIAGNOSIS — R33.9 URINARY RETENTION: ICD-10-CM

## 2025-02-25 DIAGNOSIS — N40.1 BPH WITH OBSTRUCTION/LOWER URINARY TRACT SYMPTOMS: Primary | ICD-10-CM

## 2025-02-25 PROCEDURE — 99213 OFFICE O/P EST LOW 20 MIN: CPT | Performed by: NURSE PRACTITIONER

## 2025-02-25 PROCEDURE — G2211 COMPLEX E/M VISIT ADD ON: HCPCS | Performed by: NURSE PRACTITIONER

## 2025-02-25 PROCEDURE — 51798 US URINE CAPACITY MEASURE: CPT | Performed by: NURSE PRACTITIONER

## 2025-02-25 PROCEDURE — 1036F TOBACCO NON-USER: CPT | Performed by: NURSE PRACTITIONER

## 2025-02-25 PROCEDURE — 1160F RVW MEDS BY RX/DR IN RCRD: CPT | Performed by: NURSE PRACTITIONER

## 2025-02-25 PROCEDURE — 1159F MED LIST DOCD IN RCRD: CPT | Performed by: NURSE PRACTITIONER

## 2025-02-25 RX ORDER — ALFUZOSIN HYDROCHLORIDE 10 MG/1
10 TABLET, EXTENDED RELEASE ORAL DAILY
Qty: 30 TABLET | Refills: 11 | Status: SHIPPED | OUTPATIENT
Start: 2025-02-25 | End: 2026-02-25

## 2025-03-23 ENCOUNTER — OFFICE VISIT (OUTPATIENT)
Dept: URGENT CARE | Age: 65
End: 2025-03-23
Payer: MEDICARE

## 2025-03-23 VITALS
OXYGEN SATURATION: 97 % | DIASTOLIC BLOOD PRESSURE: 80 MMHG | TEMPERATURE: 98.5 F | HEIGHT: 74 IN | WEIGHT: 197 LBS | HEART RATE: 66 BPM | RESPIRATION RATE: 16 BRPM | SYSTOLIC BLOOD PRESSURE: 120 MMHG | BODY MASS INDEX: 25.28 KG/M2

## 2025-03-23 DIAGNOSIS — M54.2 CERVICALGIA: ICD-10-CM

## 2025-03-23 DIAGNOSIS — S39.012A STRAIN OF LUMBAR REGION, INITIAL ENCOUNTER: Primary | ICD-10-CM

## 2025-03-23 PROCEDURE — 99203 OFFICE O/P NEW LOW 30 MIN: CPT | Performed by: NURSE PRACTITIONER

## 2025-03-23 PROCEDURE — 1159F MED LIST DOCD IN RCRD: CPT | Performed by: NURSE PRACTITIONER

## 2025-03-23 PROCEDURE — 1036F TOBACCO NON-USER: CPT | Performed by: NURSE PRACTITIONER

## 2025-03-23 PROCEDURE — 3008F BODY MASS INDEX DOCD: CPT | Performed by: NURSE PRACTITIONER

## 2025-03-23 RX ORDER — CYCLOBENZAPRINE HCL 10 MG
10 TABLET ORAL 3 TIMES DAILY PRN
Qty: 21 TABLET | Refills: 0 | Status: SHIPPED | OUTPATIENT
Start: 2025-03-23

## 2025-03-23 RX ORDER — METHYLPREDNISOLONE 4 MG/1
TABLET ORAL
Qty: 21 TABLET | Refills: 0 | Status: SHIPPED | OUTPATIENT
Start: 2025-03-23 | End: 2025-03-29

## 2025-03-23 ASSESSMENT — ENCOUNTER SYMPTOMS
BACK PAIN: 1
NECK PAIN: 1
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
## 2025-04-04 ENCOUNTER — APPOINTMENT (OUTPATIENT)
Dept: UROLOGY | Facility: CLINIC | Age: 65
End: 2025-04-04
Payer: COMMERCIAL

## 2025-04-08 ENCOUNTER — APPOINTMENT (OUTPATIENT)
Dept: UROLOGY | Facility: CLINIC | Age: 65
End: 2025-04-08
Payer: MEDICARE

## 2025-05-02 ENCOUNTER — APPOINTMENT (OUTPATIENT)
Dept: UROLOGY | Facility: CLINIC | Age: 65
End: 2025-05-02
Payer: MEDICARE

## 2025-05-02 VITALS — TEMPERATURE: 97.3 F

## 2025-05-02 DIAGNOSIS — N40.1 BPH WITH OBSTRUCTION/LOWER URINARY TRACT SYMPTOMS: Primary | ICD-10-CM

## 2025-05-02 DIAGNOSIS — Z12.5 SCREENING FOR PROSTATE CANCER: ICD-10-CM

## 2025-05-02 DIAGNOSIS — N13.8 BPH WITH OBSTRUCTION/LOWER URINARY TRACT SYMPTOMS: Primary | ICD-10-CM

## 2025-05-02 PROCEDURE — 1159F MED LIST DOCD IN RCRD: CPT | Performed by: NURSE PRACTITIONER

## 2025-05-02 PROCEDURE — 1126F AMNT PAIN NOTED NONE PRSNT: CPT | Performed by: NURSE PRACTITIONER

## 2025-05-02 PROCEDURE — 1160F RVW MEDS BY RX/DR IN RCRD: CPT | Performed by: NURSE PRACTITIONER

## 2025-05-02 PROCEDURE — 51798 US URINE CAPACITY MEASURE: CPT | Performed by: NURSE PRACTITIONER

## 2025-05-02 PROCEDURE — 99213 OFFICE O/P EST LOW 20 MIN: CPT | Performed by: NURSE PRACTITIONER

## 2025-05-02 PROCEDURE — G2211 COMPLEX E/M VISIT ADD ON: HCPCS | Performed by: NURSE PRACTITIONER

## 2025-05-02 ASSESSMENT — PATIENT HEALTH QUESTIONNAIRE - PHQ9
SUM OF ALL RESPONSES TO PHQ9 QUESTIONS 1 AND 2: 0
2. FEELING DOWN, DEPRESSED OR HOPELESS: NOT AT ALL
1. LITTLE INTEREST OR PLEASURE IN DOING THINGS: NOT AT ALL

## 2025-05-02 ASSESSMENT — PAIN SCALES - GENERAL: PAINLEVEL_OUTOF10: 0-NO PAIN

## 2025-05-02 ASSESSMENT — ENCOUNTER SYMPTOMS
OCCASIONAL FEELINGS OF UNSTEADINESS: 0
DEPRESSION: 0
LOSS OF SENSATION IN FEET: 0

## 2025-05-02 ASSESSMENT — COLUMBIA-SUICIDE SEVERITY RATING SCALE - C-SSRS
2. HAVE YOU ACTUALLY HAD ANY THOUGHTS OF KILLING YOURSELF?: NO
1. IN THE PAST MONTH, HAVE YOU WISHED YOU WERE DEAD OR WISHED YOU COULD GO TO SLEEP AND NOT WAKE UP?: NO
6. HAVE YOU EVER DONE ANYTHING, STARTED TO DO ANYTHING, OR PREPARED TO DO ANYTHING TO END YOUR LIFE?: NO

## 2025-05-02 NOTE — PROGRESS NOTES
"  [facility]  Outpatient Clinic Note    Subjective   Hernandez Lazcano is a 65 y.o. male    History of Present Illness   Patient presenting to clinic today for FUV. History of BPH with urinary retention. Taking Alfuzosin 10 mg daily.No bothersome urinary symptoms today. No straining, strong stream. Patient denies gross hematuria, dysuria, frequency, fevers, n/v, or flank pain. NTF 0    In review  ED visit 8/16/2024 for urinary retention, Hi catheter placed. Passed TOV with myself 8/22/2024.     8/27/2024 office visit for PVR check with myself which was 980 ml. Hi catheter placed and increased Tamsulosin to 0.8 mg daily.     Visit with Dr. Garcia 8/29/2024 to evaluate leaking around catheter    9/10/2024 FUV TOV with myself passed.     PVR today 44 ml   Unable to leave Urine sample     No results found for: \"PSA\"]      Medications  Current Outpatient Medications on File Prior to Visit   Medication Sig Dispense Refill    alfuzosin (UroxatraL) 10 mg 24 hr tablet Take 1 tablet (10 mg) by mouth once daily. Do not crush, chew, or split. 30 tablet 11    cyclobenzaprine (Flexeril) 10 mg tablet Take 1 tablet (10 mg) by mouth 3 times a day as needed for muscle spasms for up to 21 doses. 21 tablet 0     No current facility-administered medications on file prior to visit.       Objective   Physicial Exam  General: Well developed, well nourished, alert and cooperative, appears in no acute distress  Eyes: Non-injected conjunctiva, sclera clear, no proptosis  Cardiac: Extremities are warm and well perfused. No edema, cyanosis or pallor.   Lungs: Breathing is easy, non-labored. Speaking in clear and complete sentences. Normal diaphragmatic movement.  MSK: Ambulatory with steady gait, unassisted  Neuro: alert and oriented to person, place and time  Psych: Demonstrates good judgement and reason, without hallucinations, abnormal affect or abnormal behaviors.  Skin: no obvious lesions, no rashes.  : Urine clear, yellow, no " gross hematuria or clots     No visits with results within 1 Day(s) from this visit.   Latest known visit with results is:   Office Visit on 10/04/2024   Component Date Value Ref Range Status    POC Color, Urine 10/04/2024 Yellow  Straw, Yellow, Light-Yellow Final    POC Appearance, Urine 10/04/2024 Clear  Clear Final    POC Glucose, Urine 10/04/2024 NEGATIVE  NEGATIVE mg/dl Final    POC Bilirubin, Urine 10/04/2024 NEGATIVE  NEGATIVE Final    POC Ketones, Urine 10/04/2024 NEGATIVE  NEGATIVE mg/dl Final    POC Specific Gravity, Urine 10/04/2024 1.025  1.005 - 1.035 Final    POC Blood, Urine 10/04/2024 NEGATIVE  NEGATIVE Final    POC PH, Urine 10/04/2024 6.0  No Reference Range Established PH Final    POC Protein, Urine 10/04/2024 NEGATIVE  NEGATIVE, 30 (1+) mg/dl Final    POC Urobilinogen, Urine 10/04/2024 1.0  0.2, 1.0 EU/DL Final    Poc Nitrite, Urine 10/04/2024 POSITIVE (A)  NEGATIVE Final    POC Leukocytes, Urine 10/04/2024 TRACE (A)  NEGATIVE Final    Color, Urine 10/04/2024 Yellow  Light-Yellow, Yellow, Dark-Yellow Final    Appearance, Urine 10/04/2024 Turbid (N)  Clear Final    Specific Gravity, Urine 10/04/2024 1.023  1.005 - 1.035 Final    pH, Urine 10/04/2024 6.0  5.0, 5.5, 6.0, 6.5, 7.0, 7.5, 8.0 Final    Protein, Urine 10/04/2024 NEGATIVE  NEGATIVE, 10 (TRACE), 20 (TRACE) mg/dL Final    Glucose, Urine 10/04/2024 Normal  Normal mg/dL Final    Blood, Urine 10/04/2024 NEGATIVE  NEGATIVE Final    Ketones, Urine 10/04/2024 NEGATIVE  NEGATIVE mg/dL Final    Bilirubin, Urine 10/04/2024 NEGATIVE  NEGATIVE Final    Urobilinogen, Urine 10/04/2024 Normal  Normal mg/dL Final    Nitrite, Urine 10/04/2024 NEGATIVE  NEGATIVE Final    Leukocyte Esterase, Urine 10/04/2024 75 Chilo/µL (A)  NEGATIVE Final    Urine Culture 10/04/2024 >100,000 Escherichia coli (A)   Final    WBC, Urine 10/04/2024 11-20 (A)  1-5, NONE /HPF Final    RBC, Urine 10/04/2024 NONE  NONE, 1-2, 3-5 /HPF Final    Mucus, Urine 10/04/2024 FEW  Reference  range not established. /LPF Final      Review of Systems  All other systems have been reviewed and are negative for complaint.      Assessment and Plan       1) BPH with urinary retention    Today we discussed BPH. BPH is the benign growth of prostate tissue that may cause bothersome urinary symptoms. The mechanism of action as well as the risks, benefits, common side effects, and alternatives to all prescribed medications were discussed with the patient at length. Additionally, if you continue to experience bothersome symptoms despite medication, there are minimally invasive procedures to alleviate these symptoms.  PVR today 44 ml    Continue Alfuzosin 10 mg daily, refilled today   Please obtain PSA, I will call if abnormal     RTC 6 months, sooner if needed     All questions and concerns were addressed. Patient verbalizes understanding and has no other questions at this time.     Sanjuana Pop-- LEISA LAI  Office Phone:  [phone]

## 2025-09-05 ENCOUNTER — HOSPITAL ENCOUNTER (EMERGENCY)
Facility: HOSPITAL | Age: 65
Discharge: HOME | End: 2025-09-05
Attending: EMERGENCY MEDICINE
Payer: MEDICARE

## 2025-09-05 VITALS
WEIGHT: 205 LBS | BODY MASS INDEX: 26.31 KG/M2 | DIASTOLIC BLOOD PRESSURE: 78 MMHG | RESPIRATION RATE: 16 BRPM | HEIGHT: 74 IN | HEART RATE: 77 BPM | OXYGEN SATURATION: 98 % | SYSTOLIC BLOOD PRESSURE: 167 MMHG

## 2025-09-05 DIAGNOSIS — R33.8 ACUTE URINARY RETENTION: Primary | ICD-10-CM

## 2025-09-05 LAB
ALBUMIN SERPL BCP-MCNC: 4.3 G/DL (ref 3.4–5)
ALP SERPL-CCNC: 245 U/L (ref 33–136)
ALT SERPL W P-5'-P-CCNC: 19 U/L (ref 10–52)
ANION GAP SERPL CALC-SCNC: 13 MMOL/L (ref 10–20)
AST SERPL W P-5'-P-CCNC: 19 U/L (ref 9–39)
BASOPHILS # BLD AUTO: 0.02 X10*3/UL (ref 0–0.1)
BASOPHILS NFR BLD AUTO: 0.2 %
BILIRUB SERPL-MCNC: 0.7 MG/DL (ref 0–1.2)
BUN SERPL-MCNC: 14 MG/DL (ref 6–23)
CALCIUM SERPL-MCNC: 9.6 MG/DL (ref 8.6–10.6)
CHLORIDE SERPL-SCNC: 102 MMOL/L (ref 98–107)
CO2 SERPL-SCNC: 26 MMOL/L (ref 21–32)
CREAT SERPL-MCNC: 1.09 MG/DL (ref 0.5–1.3)
EGFRCR SERPLBLD CKD-EPI 2021: 75 ML/MIN/1.73M*2
EOSINOPHIL # BLD AUTO: 0.01 X10*3/UL (ref 0–0.7)
EOSINOPHIL NFR BLD AUTO: 0.1 %
ERYTHROCYTE [DISTWIDTH] IN BLOOD BY AUTOMATED COUNT: 12.4 % (ref 11.5–14.5)
GLUCOSE SERPL-MCNC: 138 MG/DL (ref 74–99)
HCT VFR BLD AUTO: 40.4 % (ref 41–52)
HGB BLD-MCNC: 14 G/DL (ref 13.5–17.5)
IMM GRANULOCYTES # BLD AUTO: 0.03 X10*3/UL (ref 0–0.7)
IMM GRANULOCYTES NFR BLD AUTO: 0.4 % (ref 0–0.9)
LYMPHOCYTES # BLD AUTO: 0.44 X10*3/UL (ref 1.2–4.8)
LYMPHOCYTES NFR BLD AUTO: 5.3 %
MCH RBC QN AUTO: 30.8 PG (ref 26–34)
MCHC RBC AUTO-ENTMCNC: 34.7 G/DL (ref 32–36)
MCV RBC AUTO: 89 FL (ref 80–100)
MONOCYTES # BLD AUTO: 0.32 X10*3/UL (ref 0.1–1)
MONOCYTES NFR BLD AUTO: 3.9 %
NEUTROPHILS # BLD AUTO: 7.45 X10*3/UL (ref 1.2–7.7)
NEUTROPHILS NFR BLD AUTO: 90.1 %
NRBC BLD-RTO: 0 /100 WBCS (ref 0–0)
PLATELET # BLD AUTO: 220 X10*3/UL (ref 150–450)
POTASSIUM SERPL-SCNC: 4 MMOL/L (ref 3.5–5.3)
PROT SERPL-MCNC: 7.3 G/DL (ref 6.4–8.2)
RBC # BLD AUTO: 4.54 X10*6/UL (ref 4.5–5.9)
SODIUM SERPL-SCNC: 137 MMOL/L (ref 136–145)
WBC # BLD AUTO: 8.3 X10*3/UL (ref 4.4–11.3)

## 2025-09-05 PROCEDURE — 36415 COLL VENOUS BLD VENIPUNCTURE: CPT | Performed by: STUDENT IN AN ORGANIZED HEALTH CARE EDUCATION/TRAINING PROGRAM

## 2025-09-05 PROCEDURE — 80053 COMPREHEN METABOLIC PANEL: CPT | Performed by: EMERGENCY MEDICINE

## 2025-09-05 PROCEDURE — 80053 COMPREHEN METABOLIC PANEL: CPT | Performed by: STUDENT IN AN ORGANIZED HEALTH CARE EDUCATION/TRAINING PROGRAM

## 2025-09-05 PROCEDURE — 85025 COMPLETE CBC W/AUTO DIFF WBC: CPT | Performed by: EMERGENCY MEDICINE

## 2025-09-05 PROCEDURE — 99283 EMERGENCY DEPT VISIT LOW MDM: CPT | Performed by: EMERGENCY MEDICINE

## 2025-09-05 PROCEDURE — 85025 COMPLETE CBC W/AUTO DIFF WBC: CPT | Performed by: STUDENT IN AN ORGANIZED HEALTH CARE EDUCATION/TRAINING PROGRAM

## 2025-09-05 ASSESSMENT — PAIN SCALES - GENERAL: PAINLEVEL_OUTOF10: 2

## 2025-09-05 ASSESSMENT — PAIN - FUNCTIONAL ASSESSMENT: PAIN_FUNCTIONAL_ASSESSMENT: 0-10

## 2026-05-05 ENCOUNTER — APPOINTMENT (OUTPATIENT)
Dept: UROLOGY | Facility: CLINIC | Age: 66
End: 2026-05-05
Payer: MEDICARE